# Patient Record
Sex: MALE | Race: WHITE | Employment: FULL TIME | URBAN - METROPOLITAN AREA
[De-identification: names, ages, dates, MRNs, and addresses within clinical notes are randomized per-mention and may not be internally consistent; named-entity substitution may affect disease eponyms.]

---

## 2017-01-09 ENCOUNTER — OFFICE VISIT (OUTPATIENT)
Dept: OPHTHALMOLOGY | Facility: CLINIC | Age: 57
End: 2017-01-09

## 2017-01-09 DIAGNOSIS — H52.03 HYPEROPIA WITH PRESBYOPIA, BILATERAL: ICD-10-CM

## 2017-01-09 DIAGNOSIS — H50.52 EXOPHORIA: Primary | ICD-10-CM

## 2017-01-09 DIAGNOSIS — Z83.518 FAMILY HISTORY OF MACULAR DEGENERATION: ICD-10-CM

## 2017-01-09 DIAGNOSIS — H52.4 HYPEROPIA WITH PRESBYOPIA, BILATERAL: ICD-10-CM

## 2017-01-09 PROCEDURE — 92014 COMPRE OPH EXAM EST PT 1/>: CPT | Performed by: OPHTHALMOLOGY

## 2017-01-09 PROCEDURE — 92015 DETERMINE REFRACTIVE STATE: CPT | Performed by: OPHTHALMOLOGY

## 2017-01-09 RX ORDER — ATORVASTATIN CALCIUM 10 MG/1
TABLET, FILM COATED ORAL
COMMUNITY
Start: 2007-04-30 | End: 2017-01-09 | Stop reason: CLARIF

## 2017-01-09 NOTE — ASSESSMENT & PLAN NOTE
Patient's mother and father both have macular degeneration. No sign of macular degeneration in this patient. Advised patient to wear sunglasses for eye protection. Discussed eye vitamins with patient. Amsler grid given to patient with instructions.

## 2017-01-09 NOTE — PATIENT INSTRUCTIONS
Family history of macular degeneration  Patient's mother and father both have macular degeneration. No sign of macular degeneration in this patient. Advised patient to wear sunglasses for eye protection. Discussed eye vitamins with patient.    Mendez burrell

## 2017-01-09 NOTE — PROGRESS NOTES
Aram Preston is a 61year old male. HPI:     HPI     EP. LDE: 826/15. 61year old male here for a complete eye exam.  Patient states that he has a decrease in his distance for the past year. He also says that he rarely wears his Progs.   Patient has Movement      Right Left   Result Full Full         Dilation     Both eyes:  1.0% Mydriacyl @ 9:27 AM            Strabismus Exam        Method:  Cover-uncover Distance Near Near +3. 00DS Near Bifocals     Correction:  sc Ortho  x                      NPC: Follow up instructions:  Return for 1-2 years or sooner if needed . 1/9/2017  Scribed by: Kenny Cantu.  Anjali Mack MD

## 2017-02-28 ENCOUNTER — TELEPHONE (OUTPATIENT)
Dept: GASTROENTEROLOGY | Facility: CLINIC | Age: 57
End: 2017-02-28

## 2017-02-28 ENCOUNTER — PATIENT MESSAGE (OUTPATIENT)
Dept: GASTROENTEROLOGY | Facility: CLINIC | Age: 57
End: 2017-02-28

## 2017-02-28 NOTE — TELEPHONE ENCOUNTER
Called pt and was not home, I left a general message with his wife Arie Motta to let him know that his request for his prep instructions were sent to him via [employer]. Told to call back if he is unable to view them.

## 2017-02-28 NOTE — TELEPHONE ENCOUNTER
Pt requesting RN to send Prep Instructions for 3/3/2017 CLN to be sent via [employer]. For questions pls call. Thank you.

## 2017-03-03 ENCOUNTER — LAB REQUISITION (OUTPATIENT)
Dept: LAB | Facility: HOSPITAL | Age: 57
End: 2017-03-03
Payer: COMMERCIAL

## 2017-03-03 DIAGNOSIS — Z01.89 ENCOUNTER FOR OTHER SPECIFIED SPECIAL EXAMINATIONS: ICD-10-CM

## 2017-03-03 PROCEDURE — 88305 TISSUE EXAM BY PATHOLOGIST: CPT | Performed by: INTERNAL MEDICINE

## 2017-03-06 NOTE — TELEPHONE ENCOUNTER
D/w Dr. Philip Gallardo pt test results. Per his recommendation the polyps are benign in nature and pt may be notified of this. Dr. Armin France will provide further recommendations upon his return to the office.

## 2017-03-06 NOTE — TELEPHONE ENCOUNTER
Pt asking for the results of his CLN from 3/3/17,   \"When will I receive the test results?  ----- Message -----  From: Kristy Mulligan\"      Routed to Kennedy VILLALTA as  not in office.

## 2017-03-06 NOTE — TELEPHONE ENCOUNTER
From: Juli Sue RN  To: Skyla Barney  Sent: 2/28/2017 3:57 PM CST  Subject: Requested Colonscopy Prep Instructions for 3/3/2017    [facility] GASTROENTEROLOGY   [address]   [place] [address] Gatorade. Mix the solution until the Miralax is dissolved. Drink 8oz of the solution every 15 minutes until HALF the solution is finished. Put the remaining solution in the refrigerator. Beginning at 7 PM-Drink the remaining Half of the solution.  Drink 8 PURPLE    Sample Menu:  \" Breakfast: 8 fl oz.  apple juice, 8 fl oz of coffee or tea with sugar, 1 cup Jell-O  \" Lunch: 8 fl oz chicken broth, ½ cup of Italian ice, 16 fl oz of Gatorade  \" Dinner: 8 fl oz beef broth, 8 fl oz of ginger ale, 1 popsicle

## 2018-03-16 ENCOUNTER — TELEPHONE (OUTPATIENT)
Dept: OPHTHALMOLOGY | Facility: CLINIC | Age: 58
End: 2018-03-16

## 2018-05-01 ENCOUNTER — HOSPITAL ENCOUNTER (OUTPATIENT)
Dept: CT IMAGING | Age: 58
Discharge: HOME | End: 2018-05-01
Attending: INTERNAL MEDICINE

## 2018-05-01 DIAGNOSIS — Z13.6 SCREENING FOR CARDIOVASCULAR CONDITION: ICD-10-CM

## 2018-05-01 NOTE — PROGRESS NOTES
[facility] [address] SCORE=52.45  IL=299/86  Cholestec labs as follows:  HR=791  HDL=65  RVV=381  TG=94  GLUCOSE=112 fasting  All results and risk factors discussed with patient; all questions and concerns addressed.   Educational

## 2018-06-12 NOTE — TELEPHONE ENCOUNTER
Pt. Called to apologize, as he forgot about his 3/16 8:45am appt, so he has sched a new appt. for 5/4. Video rounding complete. Nurse at bedside for morning assessment. NAD noted

## 2019-02-15 ENCOUNTER — OFFICE VISIT (OUTPATIENT)
Dept: OPHTHALMOLOGY | Facility: CLINIC | Age: 59
End: 2019-02-15
Payer: COMMERCIAL

## 2019-02-15 DIAGNOSIS — H50.52 EXOPHORIA: Primary | ICD-10-CM

## 2019-02-15 DIAGNOSIS — H52.4 HYPEROPIA WITH PRESBYOPIA OF BOTH EYES: ICD-10-CM

## 2019-02-15 DIAGNOSIS — Z83.518 FAMILY HISTORY OF MACULAR DEGENERATION: ICD-10-CM

## 2019-02-15 DIAGNOSIS — H52.03 HYPEROPIA WITH PRESBYOPIA OF BOTH EYES: ICD-10-CM

## 2019-02-15 PROCEDURE — 92015 DETERMINE REFRACTIVE STATE: CPT | Performed by: OPHTHALMOLOGY

## 2019-02-15 PROCEDURE — 92014 COMPRE OPH EXAM EST PT 1/>: CPT | Performed by: OPHTHALMOLOGY

## 2019-02-15 NOTE — PROGRESS NOTES
Angela Fuller is a 60year old male. HPI:     HPI     NP/ 60 yr old here for a complete exam. LDE 1/9/17 with Hx of exophoria, hyperopia and presbyopia. Pt is currently wearing OTC reading glasses. Pt denies any blurred vision at distance or near. External Normal Normal          Slit Lamp Exam       Right Left    Lids/Lashes Normal Normal    Conjunctiva/Sclera Normal Normal    Cornea Clear Clear    Anterior Chamber Deep and quiet Deep and quiet    Iris Normal Normal    Lens Clear Clear    Vitreous C

## 2021-08-14 ENCOUNTER — HOSPITAL ENCOUNTER (OUTPATIENT)
Age: 61
Discharge: HOME | End: 2021-08-14
Payer: COMMERCIAL

## 2021-08-14 VITALS
HEART RATE: 72 BPM | RESPIRATION RATE: 18 BRPM | DIASTOLIC BLOOD PRESSURE: 71 MMHG | SYSTOLIC BLOOD PRESSURE: 145 MMHG | TEMPERATURE: 98 F | OXYGEN SATURATION: 97 %

## 2021-08-14 DIAGNOSIS — H60.12 CELLULITIS OF ANTIHELIX OF LEFT EAR: Primary | ICD-10-CM

## 2021-08-14 PROCEDURE — 99203 OFFICE O/P NEW LOW 30 MIN: CPT

## 2021-08-14 PROCEDURE — 99213 OFFICE O/P EST LOW 20 MIN: CPT

## 2021-08-14 RX ORDER — CLINDAMYCIN HYDROCHLORIDE 150 MG/1
450 CAPSULE ORAL 3 TIMES DAILY
Qty: 90 CAPSULE | Refills: 0 | Status: SHIPPED | OUTPATIENT
Start: 2021-08-14 | End: 2021-08-24

## 2021-08-14 RX ORDER — MONTELUKAST SODIUM 10 MG/1
10 TABLET ORAL NIGHTLY
COMMUNITY
Start: 2021-08-06

## 2021-08-14 NOTE — ED INITIAL ASSESSMENT (HPI)
C/o right ear pain accompanied by selling to right side of jaw. Had a covid exposure this pat week. Tested negative for covid earlier today.

## 2021-08-14 NOTE — ED PROVIDER NOTES
Patient Seen in: [facility]      History   Patient presents with:  Ear Problem Pain: Ear ache - Entered by patient    Stated Complaint: Headache - Ear ache    HPI/Subjective:   HPI    Earlean Gun old male with htn, here today for right ear swelling Ear exam: The right ear is erythematous throughout the triangular fossa, antihelix, Ivon. Pain on palpation of the tragus. Mild tenderness to the external auditory meatus.   There is no open wound, no drainage, patient has swelling to the preauricul the patient.                                Disposition and Plan     Clinical Impression:  Cellulitis of antihelix of left ear  (primary encounter diagnosis)     Disposition:  Discharge  8/14/2021 11:19 am    Follow-up:  Franklin Rico MD  [address]

## 2021-08-27 ENCOUNTER — OFFICE VISIT (OUTPATIENT)
Dept: OPHTHALMOLOGY | Facility: CLINIC | Age: 61
End: 2021-08-27
Payer: COMMERCIAL

## 2021-08-27 DIAGNOSIS — Z83.518 FAMILY HISTORY OF MACULAR DEGENERATION: ICD-10-CM

## 2021-08-27 DIAGNOSIS — H52.4 HYPEROPIA WITH PRESBYOPIA OF BOTH EYES: ICD-10-CM

## 2021-08-27 DIAGNOSIS — H50.52 EXOPHORIA: Primary | ICD-10-CM

## 2021-08-27 DIAGNOSIS — H52.03 HYPEROPIA WITH PRESBYOPIA OF BOTH EYES: ICD-10-CM

## 2021-08-27 PROCEDURE — 92015 DETERMINE REFRACTIVE STATE: CPT | Performed by: OPHTHALMOLOGY

## 2021-08-27 PROCEDURE — 92014 COMPRE OPH EXAM EST PT 1/>: CPT | Performed by: OPHTHALMOLOGY

## 2021-08-27 NOTE — PROGRESS NOTES
Susan Ramey. is a 57year old male. HPI:     HPI     NP/ 57 yr old here for a complete exam. LDE 2/15/19 with Hx of exophoria, hyperopia and presbyopia. Patient states his vision is good. He has bifocals but rarely wears them.       Family Left Right     Full Full          Extraocular Movement       Right Left     Full, Ortho Full, Ortho          Dilation     Both eyes: 1.0% Mydriacyl and 2.5% Aidan Synephrine @ 9:49 AM            Slit Lamp and Fundus Exam     External Exam       Right [facility] No prescriptions requested or ordered in this encounter        Follow up instructions:  Return in about 1 year (around 8/27/2022), or if symptoms worsen or fail to improve, for Complete exam.    8/27/2021  Scribed by: Tamika Askew.  Denise Askew MD

## 2021-08-27 NOTE — PATIENT INSTRUCTIONS
Exophoria  Mild, no treatment    Hyperopia with presbyopia  Update glasses    Family history of macular degeneration  Parents had Macular Degeneration. Recommend Ocuvite vitamins. Asif Harry

## 2021-09-07 ENCOUNTER — TELEPHONE (OUTPATIENT)
Dept: GASTROENTEROLOGY | Facility: CLINIC | Age: 61
End: 2021-09-07

## 2021-09-07 NOTE — TELEPHONE ENCOUNTER
Pt called to schedule repeat colonoscopy. He states that he is not due until March but wonders if he can schedule that far out. Please call.

## 2021-09-08 NOTE — TELEPHONE ENCOUNTER
Patient advised he will receive a recall letter 3 months prior to his next colonoscopy(due in March 2022)   Right now we are scheduling only through January 2022. Patient voiced understanding.

## 2022-01-24 ENCOUNTER — TELEPHONE (OUTPATIENT)
Dept: GASTROENTEROLOGY | Facility: CLINIC | Age: 62
End: 2022-01-24

## 2022-01-24 DIAGNOSIS — Z86.010 PERSONAL HISTORY OF COLONIC POLYPS: Primary | ICD-10-CM

## 2022-01-25 NOTE — TELEPHONE ENCOUNTER
Final Diagnosis:      A. Sigmoid polyp; biopsy:   · Tubular adenoma.      B.  Rectum, polyps; biopsy:  · Hyperplastic polyp.

## 2022-01-25 NOTE — TELEPHONE ENCOUNTER
Left message to call back. Will await call back and/or follow up. Patient is due for colonoscopy 5 year recall. Attached previous pathology report below for review. Operative report available under media tab dated 03/22/2017.      Reconciled

## 2022-01-28 NOTE — TELEPHONE ENCOUNTER
GI Schedulers--    Please assist with scheduling 5 year follow up colonoscopy per Dr. Titi De La Rosa orders provided below. Thank you!

## 2022-01-28 NOTE — TELEPHONE ENCOUNTER
Dr. Clemente Dutta-    Please advise if patient is okay to directly schedule procedure/adivse on orders. Thank you!

## 2022-01-28 NOTE — TELEPHONE ENCOUNTER
If no symptoms may schedule for a history of polyps following a split dose MiraLAX/Gatorade preparation and monitored anesthesia care.

## 2022-01-31 NOTE — TELEPHONE ENCOUNTER
Scheduled for:  Colonoscopy - [address]  Provider Name:  Dr. Aicha Faulkner  Date:  5/24/22  Location:  [facility]  Sedation:  MAC  Time:  2:30 pm (pt is aware to arrive at 1:30 pm)  Prep:  Miralax/Gatorade, Prep instructions were given to pt over the phone, pt verbali

## 2022-05-19 RX ORDER — LOSARTAN POTASSIUM 50 MG/1
TABLET ORAL DAILY
COMMUNITY

## 2022-05-24 ENCOUNTER — HOSPITAL ENCOUNTER (OUTPATIENT)
Facility: HOSPITAL | Age: 62
Setting detail: HOSPITAL OUTPATIENT SURGERY
Discharge: HOME | End: 2022-05-24
Attending: INTERNAL MEDICINE | Admitting: INTERNAL MEDICINE
Payer: COMMERCIAL

## 2022-05-24 ENCOUNTER — ANESTHESIA (OUTPATIENT)
Dept: ENDOSCOPY | Facility: HOSPITAL | Age: 62
End: 2022-05-24
Payer: COMMERCIAL

## 2022-05-24 ENCOUNTER — ANESTHESIA EVENT (OUTPATIENT)
Dept: ENDOSCOPY | Facility: HOSPITAL | Age: 62
End: 2022-05-24
Payer: COMMERCIAL

## 2022-05-24 VITALS
HEIGHT: 71 IN | DIASTOLIC BLOOD PRESSURE: 88 MMHG | BODY MASS INDEX: 30.52 KG/M2 | WEIGHT: 218 LBS | OXYGEN SATURATION: 97 % | SYSTOLIC BLOOD PRESSURE: 142 MMHG | TEMPERATURE: 98 F | HEART RATE: 75 BPM | RESPIRATION RATE: 18 BRPM

## 2022-05-24 DIAGNOSIS — Z86.010 PERSONAL HISTORY OF COLONIC POLYPS: ICD-10-CM

## 2022-05-24 PROCEDURE — 0DBH8ZX EXCISION OF CECUM, VIA NATURAL OR ARTIFICIAL OPENING ENDOSCOPIC, DIAGNOSTIC: ICD-10-PCS | Performed by: INTERNAL MEDICINE

## 2022-05-24 PROCEDURE — 45380 COLONOSCOPY AND BIOPSY: CPT | Performed by: INTERNAL MEDICINE

## 2022-05-24 RX ORDER — SODIUM CHLORIDE, SODIUM LACTATE, POTASSIUM CHLORIDE, CALCIUM CHLORIDE 600; 310; 30; 20 MG/100ML; MG/100ML; MG/100ML; MG/100ML
INJECTION, SOLUTION INTRAVENOUS CONTINUOUS
Status: DISCONTINUED | OUTPATIENT
Start: 2022-05-24 | End: 2022-05-24

## 2022-05-24 RX ORDER — LIDOCAINE HYDROCHLORIDE 10 MG/ML
INJECTION, SOLUTION EPIDURAL; INFILTRATION; INTRACAUDAL; PERINEURAL AS NEEDED
Status: DISCONTINUED | OUTPATIENT
Start: 2022-05-24 | End: 2022-05-24 | Stop reason: SURG

## 2022-05-24 RX ADMIN — LIDOCAINE HYDROCHLORIDE 50 MG: 10 INJECTION, SOLUTION EPIDURAL; INFILTRATION; INTRACAUDAL; PERINEURAL at 15:28:00

## 2022-05-24 RX ADMIN — SODIUM CHLORIDE, SODIUM LACTATE, POTASSIUM CHLORIDE, CALCIUM CHLORIDE: 600; 310; 30; 20 INJECTION, SOLUTION INTRAVENOUS at 15:25:00

## 2022-05-24 RX ADMIN — SODIUM CHLORIDE, SODIUM LACTATE, POTASSIUM CHLORIDE, CALCIUM CHLORIDE: 600; 310; 30; 20 INJECTION, SOLUTION INTRAVENOUS at 16:00:00

## 2022-05-24 NOTE — ANESTHESIA POSTPROCEDURE EVALUATION
Patient: Himanshu Abreu    Procedure Summary     Date: 05/24/22 Room / Location: [address] [place] [address] ENDOSCOPY 04 / [address] [place] [address] ENDOSCOPY    Anesthesia Start: 7111 Anesthesia Stop: 6820    Procedure: COLONOSCOPY (N/A ) Diagnosis:       Personal history of colonic polyps      (Diverticulosis; colon polyps)    Surgeons: Willy Smith MD Anesthesiologist: Cliff Zaldivar CRNA    Anesthesia Type: MAC ASA Status: 2          Anesthesia Type: MAC    Vitals Value Taken Time   /74 05/24/22 1602   Temp 36.8 05/24/22 1602   Pulse 69 05/24/22 1602   Resp 17 05/24/22 1602   SpO2 97 05/24/22 1602       [address] [place] [address] AN Post Evaluation:   Patient Evaluated in PACU  Patient Participation: complete - patient participated  Level of Consciousness: awake  Pain Management: adequate  Airway Patency:patent  Dental exam unchanged from preop  Yes    Cardiovascular Status: acceptable  Respiratory Status: acceptable  Postoperative Hydration acceptable      Sunil Horton CRNA  5/24/2022 4:02 PM

## 2022-05-24 NOTE — OPERATIVE REPORT
[facility] Endoscopy Report      Date of Procedure:  05/24/22      Preoperative Diagnosis:  Personal history of adenomatous colon polyp      Postoperative Diagnosis:  1. Colon polyps  2. Colonic diverticulosis      Procedure:    Colonoscopy with cold biopsy removal of polyps      Surgeon:  Maxine Pierre M.D. Anesthesia:  Monitored anesthesia care  Cecal withdrawal time:  21 minutes  EBL:  Insignificant      Brief History: This is a 60year old male who presents for a surveillance colonoscopy the setting of a history of adenomatous colon polyps. The patient's last colonoscopy was a little over 5 years prior with removal of a subcentimeter tubular adenoma. He has been asymptomatic from a lower gastrointestinal tract standpoint. Technique:  After informed consent, the patient was placed in the left lateral recumbent position. Digital rectal examination revealed no palpable intraluminal abnormalities. An Olympus variable stiffness 190 series HD colonoscope was inserted into the rectum and advanced under direct vision by following the lumen to the cecum confirmed by landmarks of the ileocecal valve and appendiceal orifice. The colon was examined upon withdrawal in the left lateral recumbent position. Findings:  The preparation of the colon was rendered good, however, a fair amount of irrigation was required to remove pools of opaque fluid with some particulate matter. The visualized colonic mucosa from the cecum to the anal verge was normal with an intact vascular pattern. In the cecum there were #2 2-3 mm sessile polyps which were removed with a cold biopsy forceps. No ongoing bleeding. There were a few tiny diverticula seen in the region of the hepatic flexure and multiple diverticula in the sigmoid without current signs of complication. There were no other colonic polyps, mass lesions, vascular anomalies or signs of inflammation seen.   Retroflexion in the rectum revealed no abnormalities. The procedure was well tolerated without immediate complication. Impression:  1. Diminutive cecal polyps  2. Uncomplicated colonic diverticulosis    Recommendations:  1. High-fiber diet. 2.  Follow-up biopsy results. 3.  Probable surveillance colonoscopy in 5 years.         Homa Murcia MD  5/24/2022

## 2022-05-26 ENCOUNTER — TELEPHONE (OUTPATIENT)
Dept: GASTROENTEROLOGY | Facility: CLINIC | Age: 62
End: 2022-05-26

## 2022-05-26 NOTE — TELEPHONE ENCOUNTER
Health Maintenance Updated. 5 year colonoscopy recall entered into patient outreach in [address] [place] [address] Rd. Next colonoscopy will be due 5/242027.

## 2022-05-26 NOTE — TELEPHONE ENCOUNTER
----- Message from Michel Georges MD sent at 5/25/2022  6:07 PM CDT -----  I spoke to the patient. He is feeling well. He had #2 subcentimeter polyps removed. #1 was a tubular adenoma and the other non-adenomatous. I discussed the significance of adenomatous polyps. I recommended a high-fiber diet for diverticulosis. Based on previous adenomas and current findings, I would recommend a surveillance colonoscopy in 5 years. GI RNs: Please enter colonoscopy recall for 5 years.

## 2023-01-06 ENCOUNTER — OFFICE VISIT (OUTPATIENT)
Dept: OPHTHALMOLOGY | Facility: CLINIC | Age: 63
End: 2023-01-06
Payer: COMMERCIAL

## 2023-01-06 DIAGNOSIS — H52.03 HYPEROPIA WITH PRESBYOPIA OF BOTH EYES: ICD-10-CM

## 2023-01-06 DIAGNOSIS — H52.4 HYPEROPIA WITH PRESBYOPIA OF BOTH EYES: ICD-10-CM

## 2023-01-06 DIAGNOSIS — H02.34 BLEPHAROCHALASIS OF BOTH UPPER EYELIDS: Primary | ICD-10-CM

## 2023-01-06 DIAGNOSIS — Z83.518 FAMILY HISTORY OF MACULAR DEGENERATION: ICD-10-CM

## 2023-01-06 DIAGNOSIS — H02.31 BLEPHAROCHALASIS OF BOTH UPPER EYELIDS: Primary | ICD-10-CM

## 2023-01-06 PROCEDURE — 92014 COMPRE OPH EXAM EST PT 1/>: CPT | Performed by: OPHTHALMOLOGY

## 2023-01-06 PROCEDURE — 92015 DETERMINE REFRACTIVE STATE: CPT | Performed by: OPHTHALMOLOGY

## 2023-01-06 RX ORDER — LOSARTAN POTASSIUM 100 MG/1
TABLET ORAL
COMMUNITY
Start: 2022-07-01

## 2023-01-06 NOTE — PATIENT INSTRUCTIONS
Hyperopia with presbyopia  New glasses. Bifocals or distance only. Blepharochalasis of both upper eyelids  Mild, no treatment. Family history of macular degeneration  Parents had Macular Degeneration. Recommend Ocuvite vitamins. . No sign of Macular Degeneration in Patient.

## 2023-01-06 NOTE — ASSESSMENT & PLAN NOTE
Parents had Macular Degeneration. Recommend Ocuvite vitamins. . No sign of Macular Degeneration in Patient.

## 2024-06-17 ENCOUNTER — OFFICE VISIT (OUTPATIENT)
Dept: OPHTHALMOLOGY | Facility: CLINIC | Age: 64
End: 2024-06-17
Payer: COMMERCIAL

## 2024-06-17 DIAGNOSIS — H52.03 HYPEROPIA WITH PRESBYOPIA OF BOTH EYES: ICD-10-CM

## 2024-06-17 DIAGNOSIS — H02.34 BLEPHAROCHALASIS OF BOTH UPPER EYELIDS: ICD-10-CM

## 2024-06-17 DIAGNOSIS — H02.31 BLEPHAROCHALASIS OF BOTH UPPER EYELIDS: ICD-10-CM

## 2024-06-17 DIAGNOSIS — Z83.518 FAMILY HISTORY OF MACULAR DEGENERATION: ICD-10-CM

## 2024-06-17 DIAGNOSIS — H50.52 EXOPHORIA: Primary | ICD-10-CM

## 2024-06-17 DIAGNOSIS — H52.4 HYPEROPIA WITH PRESBYOPIA OF BOTH EYES: ICD-10-CM

## 2024-06-17 PROCEDURE — 92015 DETERMINE REFRACTIVE STATE: CPT | Performed by: OPHTHALMOLOGY

## 2024-06-17 PROCEDURE — 92014 COMPRE OPH EXAM EST PT 1/>: CPT | Performed by: OPHTHALMOLOGY

## 2024-06-17 NOTE — ASSESSMENT & PLAN NOTE
Parents had Macular Degeneration. Recommend Ocuvite vitamins.. No sign of Macular Degeneration in Patient.

## 2024-06-17 NOTE — PROGRESS NOTES
Helio Mulligan Jr. is a 64 year old male.    HPI:     HPI    EP/ 64 year old here for a complete exam.  LDE was 1/06/23 with a history of hyperopia with presbyopia and blepharochalasis in both eyes. Patient states his vision is good. He has been wearing the bifocals more now with driving. Needs his glasses more often to see clearly.  Last edited by Coretta Clifford MD on 6/17/2024  4:10 PM.        Patient History:  Past Medical History:    Corneal arcus senilis    per [employer]: OU / (please verify)    Exophoria    Family history of macular degeneration    High blood pressure    High cholesterol    Hyperopia    per [employer]: OU    Hyperopia with presbyopia    Other and unspecified hyperlipidemia    Presbyopia OU    per [employer]: OU       Surgical History: Helio Mulligan Jr. has a past surgical history that includes colonoscopy (N/A, 5/24/2022) (Procedure: COLONOSCOPY;  Surgeon: Nehemias Campos MD;  Location: [facility] ENDOSCOPY).    Family History   Problem Relation Age of Onset    Eye Problems Other         family h/o ARMD    Macular degeneration Mother     Macular degeneration Father     Diabetes Paternal Grandfather     Glaucoma Neg        Social History:   Social History     Socioeconomic History    Marital status:    Tobacco Use    Smoking status: Never    Smokeless tobacco: Never   Substance and Sexual Activity    Alcohol use: No     Comment: socially    Drug use: No   Other Topics Concern    Caffeine Concern Yes     Comment: coffee, 2 cups     Social Determinants of Health     Financial Resource Strain: Low Risk  (5/24/2024)    Received from [facility]    Overall Financial Resource Strain (CARDIA)     Difficulty of Paying Living Expenses: Not very hard   Food Insecurity: No Food Insecurity (5/24/2024)    Received from [facility]    Hunger Vital Sign     Worried About Running Out of Food in the Last Year: Never true     Ran Out of Food in the Last  Year: Never true   Transportation Needs: No Transportation Needs (5/24/2024)    Received from [facility]    PRAPARE - Transportation     Lack of Transportation (Medical): No     Lack of Transportation (Non-Medical): No    Received from [facility]    Social Connections   Housing Stability: Unknown (5/24/2024)    Received from [facility]    Housing Stability Vital Sign     Unable to Pay for Housing in the Last Year: No     In the last 12 months, was there a time when you did not have a steady place to sleep or slept in a shelter (including now)?: No       Medications:  Current Outpatient Medications   Medication Sig Dispense Refill    losartan 100 MG Oral Tab       losartan 50 MG Oral Tab Take by mouth daily.      Montelukast Sodium 10 MG Oral Tab Take 10 mg by mouth nightly.      VENTOLIN  (90 BASE) MCG/ACT Inhalation Aero Soln  (Patient not taking: Reported on 1/6/2023)  10    atorvastatin 10 MG Oral Tab Take 10 mg by mouth daily.      aspirin 81 MG Oral Tab Take 81 mg by mouth daily.         Allergies:  No Known Allergies    ROS:       PHYSICAL EXAM:     Base Eye Exam       Visual Acuity (Snellen - Linear)         Right Left    Dist cc 20/20 20/20    Near cc 20/25 20/25      Correction: Glasses              Tonometry (Applanation, 11:26 AM)         Right Left    Pressure 16 17              Pupils         Pupils APD    Right PERRL None    Left PERRL None              Visual Fields (Counting fingers)         Left Right     Full Full              Extraocular Movement         Right Left     Full Full              Neuro/Psych       Oriented x3: Yes              Dilation       Both eyes: 1.0% Mydriacyl and 2.5% Aidan Synephrine @ 11:26 AM                  Additional Tests       Fusional Vergence       Near point of convergence: To the nose                  Strabismus Exam       Distance Near Near +3DS N Bifocals    Ortho  X'                    Slit Lamp  and Fundus Exam       External Exam         Right Left    External Normal Normal              Slit Lamp Exam         Right Left    Lids/Lashes Blepharochalasis Blepharochalasis    Conjunctiva/Sclera Normal Normal    Cornea Clear Clear    Anterior Chamber Deep and quiet Deep and quiet    Iris Normal Normal    Lens Clear Clear    Vitreous Clear Clear              Fundus Exam         Right Left    Disc Normal Normal    C/D Ratio 0.2 0.2    Macula Normal Normal    Vessels Normal Normal    Periphery Normal Normal                  Refraction       Wearing Rx         Sphere Cylinder Add    Right +1.00 Sphere +2.25    Left +1.00 Sphere +2.25      Type: Progressive bifocal              Wearing Rx #2         Sphere Cylinder Add    Right +2.50 Sphere     Left +2.50 Sphere       Type: OTC reading only              Manifest Refraction         Sphere Dist VA Add    Right +1.75 20/20 +2.25    Left +1.25 20/20 +2.25   M1 Dr. Clifford             Final Rx         Sphere Dist VA Add    Right +1.75 20/20 +2.00    Left +1.25 20/20 +2.00      Type: Progressive bifocal                     ASSESSMENT/PLAN:     Diagnoses and Plan:     Blepharochalasis of both upper eyelids  Mild, no treatment.    Exophoria  Mild, no treatment    Family history of macular degeneration  Parents had Macular Degeneration. Recommend Ocuvite vitamins.. No sign of Macular Degeneration in Patient.     Hyperopia of both eyes  New Bifocal glasses. Progressives.    Hyperopia with presbyopia  New glasses. Bifocals.  Also can have over the counter Readers if patient wants.    No orders of the defined types were placed in this encounter.      Meds This Visit:  Requested Prescriptions      No prescriptions requested or ordered in this encounter        Follow up instructions:  Return in about 1 year (around 6/17/2025), or if symptoms worsen or fail to improve, for Complete exam.    6/17/2024  Scribed by: Coretta Clifford MD

## 2024-06-17 NOTE — PATIENT INSTRUCTIONS
Blepharochalasis of both upper eyelids  Mild, no treatment.    Exophoria  Mild, no treatment    Family history of macular degeneration  Parents had Macular Degeneration. Recommend Ocuvite vitamins.. No sign of Macular Degeneration in Patient.     Hyperopia of both eyes  New Bifocal glasses. Progressives.    Hyperopia with presbyopia  New glasses. Bifocals.  Also can have over the counter Readers if patient wants.

## 2024-11-06 ENCOUNTER — HOSPITAL ENCOUNTER (OUTPATIENT)
Dept: MRI IMAGING | Age: 64
Discharge: HOME | End: 2024-11-06
Payer: COMMERCIAL

## 2024-11-06 DIAGNOSIS — M54.50 LOW BACK PAIN: ICD-10-CM

## 2024-11-06 PROCEDURE — 72148 MRI LUMBAR SPINE W/O DYE: CPT

## 2025-08-28 ENCOUNTER — APPOINTMENT (OUTPATIENT)
Dept: URBAN - METROPOLITAN AREA CLINIC 244 | Age: 65
Setting detail: DERMATOLOGY
End: 2025-08-29

## 2025-08-28 DIAGNOSIS — D22 MELANOCYTIC NEVI: ICD-10-CM

## 2025-08-28 DIAGNOSIS — L57.0 ACTINIC KERATOSIS: ICD-10-CM

## 2025-08-28 DIAGNOSIS — L82.1 OTHER SEBORRHEIC KERATOSIS: ICD-10-CM

## 2025-08-28 DIAGNOSIS — L81.4 OTHER MELANIN HYPERPIGMENTATION: ICD-10-CM

## 2025-08-28 DIAGNOSIS — Z12.83 ENCOUNTER FOR SCREENING FOR MALIGNANT NEOPLASM OF SKIN: ICD-10-CM

## 2025-08-28 DIAGNOSIS — D18.0 HEMANGIOMA: ICD-10-CM

## 2025-08-28 PROBLEM — D18.01 HEMANGIOMA OF SKIN AND SUBCUTANEOUS TISSUE: Status: ACTIVE | Noted: 2025-08-28

## 2025-08-28 PROBLEM — D22.9 MELANOCYTIC NEVI, UNSPECIFIED: Status: ACTIVE | Noted: 2025-08-28

## 2025-08-28 PROCEDURE — OTHER MIPS QUALITY: OTHER

## 2025-08-28 PROCEDURE — OTHER COUNSELING: OTHER

## 2025-08-28 PROCEDURE — OTHER LIQUID NITROGEN: OTHER

## 2025-08-28 PROCEDURE — 99203 OFFICE O/P NEW LOW 30 MIN: CPT | Mod: 25

## 2025-08-28 PROCEDURE — OTHER ADDITIONAL NOTES: OTHER

## 2025-08-28 PROCEDURE — 17003 DESTRUCT PREMALG LES 2-14: CPT

## 2025-08-28 PROCEDURE — 17000 DESTRUCT PREMALG LESION: CPT

## 2025-08-28 ASSESSMENT — LOCATION SIMPLE DESCRIPTION DERM
LOCATION SIMPLE: RIGHT FOREARM
LOCATION SIMPLE: RIGHT UPPER BACK
LOCATION SIMPLE: RIGHT CHEEK
LOCATION SIMPLE: NOSE
LOCATION SIMPLE: LEFT EAR
LOCATION SIMPLE: ABDOMEN

## 2025-08-28 ASSESSMENT — LOCATION DETAILED DESCRIPTION DERM
LOCATION DETAILED: LEFT SUPERIOR CRUS OF ANTIHELIX
LOCATION DETAILED: RIGHT MID-UPPER BACK
LOCATION DETAILED: EPIGASTRIC SKIN
LOCATION DETAILED: NASAL DORSUM
LOCATION DETAILED: RIGHT DISTAL DORSAL FOREARM
LOCATION DETAILED: RIGHT DISTAL RADIAL DORSAL FOREARM
LOCATION DETAILED: RIGHT CENTRAL MALAR CHEEK

## 2025-08-28 ASSESSMENT — LOCATION ZONE DERM
LOCATION ZONE: EAR
LOCATION ZONE: NOSE
LOCATION ZONE: ARM
LOCATION ZONE: TRUNK
LOCATION ZONE: FACE

## (undated) DEVICE — KIT CLEAN ENDOKIT 1.1OZ GOWNX2

## (undated) DEVICE — LINE MNTR ADLT SET O2 INTMD

## (undated) DEVICE — FORCEP RADIAL JAW 4

## (undated) DEVICE — MEDI-VAC NON-CONDUCTIVE SUCTION TUBING 6MM X 1.8M (6FT.) L: Brand: CARDINAL HEALTH

## (undated) DEVICE — 35 ML SYRINGE REGULAR TIP: Brand: MONOJECT

## (undated) DEVICE — KIT ENDO ORCAPOD 160/180/190

## (undated) NOTE — LETTER
[address], [place]  Authorization for Invasive Procedures  1. I hereby authorize Dr. Ginny Graham , my physician and whomever may be designated as the doctor's assistant, to perform the following operation and/or procedure:  Colonoscopy on Arina Rutherford at [facility].    2. My physician has explained to me the nature and purpose of the operation or other procedure, possible alternative methods of treatment, the risks involved and the possibility of complications to me. I understand the probable consequences of declining the recommended procedure and the alternative methods of treatment. I acknowledge that no guarantee has been made as to the result that may be obtained. 3. I recognize that during the course of this operation or other procedure, unforeseen conditions may necessitate additional or different procedures than those listed above. I, therefore, further authorize and request that the above-named physician, his/her physician assistants, or designees perform such procedures as are, in his/her professional opinion, necessary and desirable. If I have a Do Not Attempt Resuscitation (DNAR) order in place, that status will be suspended while in the operating room, procedural suite, and during the recovery period unless otherwise explicitly stated by me (or a person authorized to consent on my behalf). The surgeon or my attending physician will determine when the applicable recovery period ends for purposes of reinstating the DNAR order. 4. Should the need arise during my operation or immediate post-operative period; I also consent to the administration of blood and/or blood products.  Further, I understand that despite careful testing and screening of blood and blood products, I may still be subject to ill effects as a result of recieving a blood transfusion an/or blood producst. The following are some, but not all, of the potential risks that can occur: fever and allergic reactions, hemolytic reactions, transmission of disease such as hepatitis, AIDS, cytomegalovirus (CMV), and flluid overload. In the event that I wish to have autologous transfusions of my own blood, or a directed donor transfusion, I will discuss this with my physician. 5. I consent to the photographing of the operations or procedures to be performed for the purposes of advancing medicine, science, and/or education, provided my identity is not revealed. If the procedure has been videotaped, the physician/surgeon will obtain the original videotape. The hospital will not be responsible for storage or maintenance of this tape. 6. I consent to the presence of a  or observer as deemed necessary by my physician or his designee. 7. Any tissues or organs removed in the operation or other procedure may be disposed of by and at the discretion of [facility].    8. I understand that the physician and his/her physician assistants may not be employees or agents of [facility], [facility], nor [facility], but are independent medical practitioners who have been permitted to use its facilities for the care and treatment of their patients. 9. Patients having a sterilization procedure: I understand that if the procedure is successful the results will be permanent and it will therefore be impossible for me to inseminate, conceive or bear children. I also understand that the procedure is intended to result in sterility, although the result has not been guaranteed. 10. I CERTIFY THAT I HAVE READ AND FULLY UNDERSTAND THE ABOVE CONSENT TO OPERATION and/or OTHER PROCEDURE. 11. I acknowledge that my physician has explained sedation/analgesia administration to me including the risks and benefits. I consent to the administration of sedation/analgesia as may be necessary or desirable in the judgment of my physician. Signature of Patient:  ________________________________________________ Date: _________Time: _________    Responsible person in case of minor or unconscious: _____________________________Relationship: ____________     Witness Signature: ____________________________________________ Date: __________ Time: ___________    Statement of Physician  My signature below affirms that prior to the time of the procedure, I have explained to the patient and/or his legal representative, the risks and benefits involved in the proposed treatment and any reasonable alternative to the proposed treatment. I have also explained the risks and benefits involved in the refusal of the proposed treatment and have answered the patient's questions. If I have a significant financial interest in this procedure/surgery, I have disclosed this and had a discussion with my patient. Signature of Physician:   ________________________________________Date: _________Time:_______ Patient Name: Mary BlandColin   : 1960   Printed: May 19, 2022    Medical Record #: [id number]

## (undated) NOTE — MR AVS SNAPSHOT
[place]  [address]  [phone]               Thank you for choosing us for your health care visit with [facility].  Nina Yee MD.  We are glad to serve you and happy to provide you with this pierre Generic drug:  Albuterol Sulfate HFA                   Follow-up Instructions     Return for 1-2 years or sooner if needed .          Referral Information     Referral Order Referred to Address [facility] Phone Visits Status Diagnosis                 MyChart Get your heart pumping – brisk walking, biking, swimming Even 10 minute increments are effective and add up over the week   2 ½ hours per week – spread out over time Use a dorene to keep you motivated   Don’t forget strength training with weights and resist

## (undated) NOTE — Clinical Note
2/28/2017  [address] [place] [address] Alok Rahman [address] MAZARIEGOS [facility], [address] [place] [address] Step 1: Beginning at approximately 2:00 pm- Take two (2) Dulcolax tablets with 8oz of water. Swallow the tablets whole with a full glass of water. Step 2: Beginning at approximately 3:00pm- Mix bottle of Miralax in 64 oz of Gatorade.  Mix the solution unti ? Coffee without milk or cream (sugar, honey, artificial sweeteners are OK)  ? Sports drinks such as Gatorade, Propel or Powerade  ? Hard Candy    NOT acceptable:  ? Milk or any dairy products  ? Tomato juice  ? Fruit juices with pulp  ?  Any liquids that a

## (undated) NOTE — LETTER
[facility]  Administration of Anesthesia  1. Brando Gallo, or _________________________________ acting on his behalf, (Patient) (Dependent/Representative) request to receive anesthesia for my pending procedure/operation/treatment. A physician (anesthesiologist) alone or an anesthesiologist working with a nurse anesthetist may administer my anesthesia. 2. I understand that my anesthesiologist is not an employee or agent of the hospital, but is an independent medical practitioner who has been permitted to use its facilities for the care and treatment of his/her patients. 3. I acknowledge that a physician from [facility] or their designate(s), recommended anesthesia for me using her/his medical judgment. The type(s) of anesthesia I may receive include:                a) General Anesthesia, b) Spinal/Epidural Anesthesia, c) Regional Anesthesia or d) Monitored Anesthesia Care. 4. If my spinal, regional or monitored anesthesia care (local) is not satisfactory for my comfort, or if my medical condition requires, I consent to the administration of general anesthesia. 5. I am aware that the practice of anesthesiology is not an exact science and that some foreseeable risks or consequences may occur. Some common risks/consequences include sore throat and hoarseness, nausea and vomiting, muscle soreness, backache, damage to the mouth/teeth/vocal cords and eye injury. I understand that more rare but serious potential risks of anesthesia include blood pressure changes, drug reactions, cardiac arrest, brain damage, paralysis or death. These risks apply to whether I have general, spinal/epidural, regional or monitored anesthesia care. 6. OBSTETRIC PATIENTS: Specific risks/consequences of spinal/epidural anesthesia may include itching, low blood pressure, difficulty urinating, slowing of the baby's heart rate and headache.  Rare risks include infections, high spinal block, spinal bleeding, seizure, cardiac arrest and death. 7. AWARENESS: I understand that it is possible (but unlikely) to have explicit memory of events from the operating room while under general anesthesia. 8. ELECTROCONVULSIVE THERAPY PATIENTS: This consent serves for all treatments in a single course of therapy. 9. I understand that I must inform my anesthesiologist when I last ate and/or drank to minimize the risk of anesthesia. 10. If I am pregnant, or may pregnant, I understand that elective surgery should be postponed until after the baby is born. Anesthetics cross the placenta and may temporarily anesthetize the baby. Although fetal complications of anesthesia during pregnancy are rare, they may include birth defects, premature labor, brain damage and death. 11. I certify that I informed the anesthesiologist, to the best of my ability, about medication I take including blood thinners, anticoagulants, herbal remedies, narcotics and recreational drugs (e.g. cocaine, marijuana, PCP). Failure to inform my anesthesiologist about these medicines may increase my risk of anesthetic complications. The nature and purpose of my anesthetic management was explained to me. I had the opportunity to ask questions and the answers and information provided meet my satisfaction.   I retain the right to withdraw this consent at any time prior to the administration of said anesthetic.    ___________________________________________________           _____________________________________________________  Patient Signature                                                                                      Witness Signature                ___________________________________________________           _____________________________________________________  Date/Time                                                                                               Responsible person in case of minor/ unconscious pt /Relationship    My signature below affirms that prior to the time of the procedure, I have explained to the patient and/or his/her guardian, the risks and benefits of undergoing anesthesia, as well as any reasonable alternatives. ___________________________________________________            _____________________________________________________  Physician Signature                            Date/Time  Patient Name: Leo Abbott.      : 1960     Printed: 2022      Medical Record #: [id number]                              Page 1 of 1    ----------ANESTHESIA CONSENT----------